# Patient Record
Sex: FEMALE | Race: ASIAN | NOT HISPANIC OR LATINO
[De-identification: names, ages, dates, MRNs, and addresses within clinical notes are randomized per-mention and may not be internally consistent; named-entity substitution may affect disease eponyms.]

---

## 2024-08-06 PROBLEM — Z00.00 ENCOUNTER FOR PREVENTIVE HEALTH EXAMINATION: Status: ACTIVE | Noted: 2024-08-06

## 2024-08-16 ENCOUNTER — APPOINTMENT (OUTPATIENT)
Dept: SURGERY | Facility: CLINIC | Age: 64
End: 2024-08-16
Payer: MEDICAID

## 2024-08-16 VITALS
RESPIRATION RATE: 16 BRPM | SYSTOLIC BLOOD PRESSURE: 119 MMHG | WEIGHT: 146 LBS | HEART RATE: 76 BPM | DIASTOLIC BLOOD PRESSURE: 79 MMHG | OXYGEN SATURATION: 99 %

## 2024-08-16 PROCEDURE — 99204 OFFICE O/P NEW MOD 45 MIN: CPT

## 2024-08-16 NOTE — PLAN
[FreeTextEntry1] : - I spoke at length with the patient regarding the findings, which are consistent with biliary colic, with imaging confirming presence of gallstones - Given these findings I offered the patient robotic-assisted laparoscopic, possible open, cholecystectomy and we discussed the indications, risks including but not limited to injury to nearby anatomical structures such as bowel, liver, bile ducts, bleeding, infection, need for repeat procedure, retained stones, and alternatives to which the patient stated that she understood, gave consent for the procedure, and all her questions were answered to her apparent satisfaction - Medical risk stratification and surgical planning - Patient was advised that in the interim, should she experience fever/chills, N/V, unremitting RUQ pain, to go to the ED for  evaluation, to which she stated she would do so

## 2024-08-16 NOTE — PHYSICAL EXAM
[Respiratory Effort] : normal respiratory effort [Normal Rate and Rhythm] : normal rate and rhythm [Alert] : alert [Oriented to Person] : oriented to person [Oriented to Place] : oriented to place [Oriented to Time] : oriented to time [Calm] : calm [de-identified] : Soft, non-distended, non-TTP in all quadrants, no rebound/guarding [de-identified] : NAD [de-identified] : No skin changes to abdominal wall

## 2024-08-16 NOTE — HISTORY OF PRESENT ILLNESS
[de-identified] : [place]ian translation via Seferino Goel Patient referred by Dr. Krys Simon  Patient is a 63 year old woman with HLD, no PSHx, who presents with 10 year history of post-prandial discomfort, localized to RUQ, with abdominal US performed on 2024 demonstrating cholelithiasis. No other symptoms at this time. Denies smoking, EtOH, drug use. NKDA. Notes that she has family history of aunts who  from gallbladder cancer and pancreatic cancer.

## 2024-09-17 ENCOUNTER — TRANSCRIPTION ENCOUNTER (OUTPATIENT)
Age: 64
End: 2024-09-17

## 2024-09-19 ENCOUNTER — TRANSCRIPTION ENCOUNTER (OUTPATIENT)
Age: 64
End: 2024-09-19

## 2024-09-20 ENCOUNTER — RESULT REVIEW (OUTPATIENT)
Age: 64
End: 2024-09-20

## 2024-09-20 ENCOUNTER — APPOINTMENT (OUTPATIENT)
Dept: SURGERY | Facility: HOSPITAL | Age: 64
End: 2024-09-20

## 2024-09-20 ENCOUNTER — TRANSCRIPTION ENCOUNTER (OUTPATIENT)
Age: 64
End: 2024-09-20

## 2024-10-04 ENCOUNTER — APPOINTMENT (OUTPATIENT)
Dept: SURGERY | Facility: CLINIC | Age: 64
End: 2024-10-04
Payer: MEDICAID

## 2024-10-04 VITALS
RESPIRATION RATE: 18 BRPM | TEMPERATURE: 97.3 F | DIASTOLIC BLOOD PRESSURE: 80 MMHG | WEIGHT: 147 LBS | HEART RATE: 77 BPM | BODY MASS INDEX: 26.05 KG/M2 | OXYGEN SATURATION: 97 % | SYSTOLIC BLOOD PRESSURE: 119 MMHG | HEIGHT: 63 IN

## 2024-10-04 PROCEDURE — 99024 POSTOP FOLLOW-UP VISIT: CPT

## 2024-10-04 NOTE — HISTORY OF PRESENT ILLNESS
[de-identified] : Lithuanian translation via Seferino Goel  Patient is a 63 year old woman who underwent robotic-assisted laproscopic cholecystectomy on 9/20 who presents for postoperative follow up. States that she feels well, pain is well controlled, denies fever/chills, is tolerating diet well. No other complaints.  Pathology reveals cholelithiasis, chronic cholecystitis, and cholesterolosis which was reviewed with the patient.  Abdominal exam reveals well-healing incisions, no incisional hernia, non-TTP in all quadrants, no rebound/guarding  Patient is healing well, advised to avoid heavy lifting for total of 4 weeks. Return PRN.